# Patient Record
Sex: MALE | URBAN - NONMETROPOLITAN AREA
[De-identification: names, ages, dates, MRNs, and addresses within clinical notes are randomized per-mention and may not be internally consistent; named-entity substitution may affect disease eponyms.]

---
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
## 2023-03-29 ENCOUNTER — RX ONLY (RX ONLY)
Age: 18
End: 2023-03-29

## 2023-03-29 RX ORDER — CLOBETASOL PROPIONATE 0.25 MG/G
CREAM TOPICAL BID
Qty: 1 | Refills: 3 | Status: ERX

## 2023-04-19 ENCOUNTER — RX ONLY (RX ONLY)
Age: 18
End: 2023-04-19

## 2023-04-19 RX ORDER — CLOBETASOL PROPIONATE 0.25 MG/G
CREAM TOPICAL BID
Qty: 1 | Refills: 3 | Status: ERX